# Patient Record
(demographics unavailable — no encounter records)

---

## 2025-01-28 NOTE — BEGINNING OF VISIT
[0] : 2) Feeling down, depressed, or hopeless: Not at all (0) [Pain Scale: ___] : On a scale of 1-10, today the patient's pain is a(n) [unfilled]. [Never] : Never [Diarrhea Character] : Diarrhea: Grade 0 [[id number]] : 0 [Date Discussed (MM/DD/YY): ___] : Discussed: [unfilled] [With Patient/Caregiver] : with Patient/Caregiver [Abdominal Pain] : no abdominal pain [Vomiting] : no vomiting [Constipation] : no constipation

## 2025-01-28 NOTE — HISTORY OF PRESENT ILLNESS
[de-identified] : Hx benign left breast biopsy several years ago.  She underwent bilateral augmentation at age 39 and at age 46, implants were exchanged for rupture of saline implant.  She has not had further complications.  In 2016, she palpated a small density near her right nipple, which  did not change over time.  There was no associated trauma, bruising, bleeding, skin changes, or nipple discharge.  Patient notes last menstrual period was least 1 year ago.  On 17, bilateral mammography and sonography revealed a 5 mm palpable solid slightly irregular nodule 11 o'clock right retroareolar region.  Sonogram-guided biopsy on  revealed invasive dfaotsqavt-km-sdybxf differentiated ductal carcinoma with associated DCIS high-grade, solid type, ER+, [place] + and HER-2/velasquez equivocal, FISH amplified 2.1.  Bilateral MRI 3/1/17, revealed 6 mm oval lobulated enhancing mass in close proximity to the chest wall at 11 o'clock LEFTt suggestive of a fibroadenoma and biopsy proven at 10 o'clock RIGHT subareolar region 6 mm.  On 3/21/17, wide excision and SLN excision revealed invasive carcinoma, moderately differentiated, grade 2 (3,2,1) with focal micropapillary and apocrine features, 6 mm, no definite LVI, DCIS solid and cribriform types with intermediate nuclear grade, margins were negative, 1/2 SLN revealed a 0.15 cm single focus metastatic carcinoma with extranodal extension, ER and NJ both > 50%, Ki-67 >10%, and HER-2/velasquez 0, but FISH was amplified ratio 3.18.  Oncotype recurrence score was 9. Stage 1B - T1b 0.6 cm moderately differentiated infiltrating ductal carcinoma, M1(catalina) 1/2 SLN + for micrometastasis.  Tumor is both ER + and NJ + as well as HER-2 +.  However, Oncotype test had discordant results with score 9; however, possible tumor heterogeneity was discussed in detail.  In light of HER-2 positivity, I have recommended HER-2-based adjuvant chemotherapy. S/P wkly taxol x12 and herceptin for 1 yr. AI 10/17 - 3/24. Hands no longer hurt  "could not  a mug" . Reclast 23#3 . Bilat MMG/US  - for MRI Fall. F/U [facility] for panc cyst   ALLERGIES: NKA.  PMH: Menarche 11, LMP 10y ago.  G5, P3, AB2.   24, 30, and 35. Gyn   No hx breast-feeding.  No exogenous hormones.  GYN , Hx thyroid cancer S/P thyroidectomy and DOBBINS  39.  Colonoscopy  bone density .  S/P GB , hx boniva for 2y prior to ReclastNo other significant medical illnesses.   FAMILY HX:Color panel neg  [place] descent.  Mother pancreatic cancer 69 DOD  80 and had 2 sisters, maternal grandmother had gastric cancer at 68 and DOD at 70, father 79 with prostate cancer 62 and had 1 brother with prostate cancer 60 and DOD 70.  Paternal grandfather had lung cancer in his 80s.  Patient has 1 sister, 2 brothers, 2 daughters, and 1 son.  SOCIAL HX: Domestic , lives with her , drinks 5 glasses of wine per wk.  She exercises 4-5/wk.  Does not smoke. Heterosexual and active  [de-identified] : 1/28/25 no new c/o grand daughters 1 and 3 hair better off of AI - stopped minoxidil using revaree, hetero sex and active GYN 2/24 10/29/24 breast mri 4/16/24 bilat mmg/us colonoscopy 1/24 pilates 4-5 /wk 3-4 wine /wk 10/24/24 [facility] - stable panc cysts

## 2025-01-28 NOTE — ASSESSMENT
[With Patient/Caregiver] : With Patient/Caregiver [Designated Health Care Proxy] : Designated Health Care Proxy [Name: ___] : Name: [unfilled] [Relationship: ___] : Relationship: [unfilled] [FreeTextEntry1] : Stage 1B - T1b 0.6 cm moderately differentiated infiltrating ductal carcinoma, M1(catalina) 1/2 SLN + for micrometastasis.  Tumor is both ER + and SD + as well as HER-2 +.  However, Oncotype test had discordant results with score 9; however, possible tumor heterogeneity was discussed in detail.  In light of HER-2 positivity, I had recommended HER-2-based adjuvant chemotherapy.   S/P wkly taxol x12 and herceptin for 1 yr. AI 10/17 - 3/24. Hands improved off AI. Reclast 9/28/23#3  - [facility]. Needs f/u BMD Reviewed diet and exercise. Bilat MMG/US4/25 - for MRI10/25. F/U [facility] for panc cyst Check cbc, chem D. Routine gyn. F/U 6 months  [AdvancecareDate] : 1/28/25

## 2025-01-28 NOTE — PHYSICAL EXAM
[Fully active, able to carry on all pre-disease performance without restriction] : Status 0 - Fully active, able to carry on all pre-disease performance without restriction [Normal] : affect appropriate [de-identified] : bilat augtation Rt wle/RT, no palp [de-identified] : Lt cervical rib

## 2025-01-28 NOTE — HISTORY OF PRESENT ILLNESS
[de-identified] : Hx benign left breast biopsy several years ago.  She underwent bilateral augmentation at age 39 and at age 46, implants were exchanged for rupture of saline implant.  She has not had further complications.  In 2016, she palpated a small density near her right nipple, which  did not change over time.  There was no associated trauma, bruising, bleeding, skin changes, or nipple discharge.  Patient notes last menstrual period was least 1 year ago.  On 17, bilateral mammography and sonography revealed a 5 mm palpable solid slightly irregular nodule 11 o'clock right retroareolar region.  Sonogram-guided biopsy on  revealed invasive hicefzrjqu-ch-ybdami differentiated ductal carcinoma with associated DCIS high-grade, solid type, ER+, DC + and HER-2/velasquez equivocal, FISH amplified 2.1.  Bilateral MRI 3/1/17, revealed 6 mm oval lobulated enhancing mass in close proximity to the chest wall at 11 o'clock LEFTt suggestive of a fibroadenoma and biopsy proven at 10 o'clock RIGHT subareolar region 6 mm.  On 3/21/17, wide excision and SLN excision revealed invasive carcinoma, moderately differentiated, grade 2 (3,2,1) with focal micropapillary and apocrine features, 6 mm, no definite LVI, DCIS solid and cribriform types with intermediate nuclear grade, margins were negative, 1/2 SLN revealed a 0.15 cm single focus metastatic carcinoma with extranodal extension, ER and DC both > 50%, Ki-67 >10%, and HER-2/velasquez 0, but FISH was amplified ratio 3.18.  Oncotype recurrence score was 9. Stage 1B - T1b 0.6 cm moderately differentiated infiltrating ductal carcinoma, M1(catalina) 1/2 SLN + for micrometastasis.  Tumor is both ER + and DC + as well as HER-2 +.  However, Oncotype test had discordant results with score 9; however, possible tumor heterogeneity was discussed in detail.  In light of HER-2 positivity, I have recommended HER-2-based adjuvant chemotherapy. S/P wkly taxol x12 and herceptin for 1 yr. AI 10/17 - 3/24. Hands no longer hurt  "could not  a mug" . Reclast 23#3 . Bilat MMG/US  - for MRI Fall. F/U [facility] for panc cyst   ALLERGIES: NKA.  PMH: Menarche 11, LMP 10y ago.  G5, P3, AB2.   24, 30, and 35. Gyn   No hx breast-feeding.  No exogenous hormones.  GYN , Hx thyroid cancer S/P thyroidectomy and DOBBINS  39.  Colonoscopy  bone density .  S/P GB , hx boniva for 2y prior to ReclastNo other significant medical illnesses.   FAMILY HX:Color panel neg  [place] descent.  Mother pancreatic cancer 69 DOD  80 and had 2 sisters, maternal grandmother had gastric cancer at 68 and DOD at 70, father 79 with prostate cancer 62 and had 1 brother with prostate cancer 60 and DOD 70.  Paternal grandfather had lung cancer in his 80s.  Patient has 1 sister, 2 brothers, 2 daughters, and 1 son.  SOCIAL HX: Domestic , lives with her , drinks 5 glasses of wine per wk.  She exercises 4-5/wk.  Does not smoke. Heterosexual and active  [de-identified] : 1/28/25 no new c/o grand daughters 1 and 3 hair better off of AI - stopped minoxidil using revaree, hetero sex and active GYN 2/24 10/29/24 breast mri 4/16/24 bilat mmg/us colonoscopy 1/24 pilates 4-5 /wk 3-4 wine /wk 10/24/24 [facility] - stable panc cysts

## 2025-01-28 NOTE — PHYSICAL EXAM
[Fully active, able to carry on all pre-disease performance without restriction] : Status 0 - Fully active, able to carry on all pre-disease performance without restriction [Normal] : affect appropriate [de-identified] : bilat augtation Rt wle/RT, no palp [de-identified] : Lt cervical rib

## 2025-01-28 NOTE — REVIEW OF SYSTEMS
[Diarrhea: Grade 0] : Diarrhea: Grade 0 [Negative] : Allergic/Immunologic [FreeTextEntry8] : dryness reveree

## 2025-01-28 NOTE — ASSESSMENT
[With Patient/Caregiver] : With Patient/Caregiver [Designated Health Care Proxy] : Designated Health Care Proxy [Name: ___] : Name: [unfilled] [Relationship: ___] : Relationship: [unfilled] [FreeTextEntry1] : Stage 1B - T1b 0.6 cm moderately differentiated infiltrating ductal carcinoma, M1(catalina) 1/2 SLN + for micrometastasis.  Tumor is both ER + and DC + as well as HER-2 +.  However, Oncotype test had discordant results with score 9; however, possible tumor heterogeneity was discussed in detail.  In light of HER-2 positivity, I had recommended HER-2-based adjuvant chemotherapy.   S/P wkly taxol x12 and herceptin for 1 yr. AI 10/17 - 3/24. Hands improved off AI. Reclast 9/28/23#3  - [facility]. Needs f/u BMD Reviewed diet and exercise. Bilat MMG/US4/25 - for MRI10/25. F/U [facility] for panc cyst Check cbc, chem D. Routine gyn. F/U 6 months  [AdvancecareDate] : 1/28/25

## 2025-01-28 NOTE — HISTORY OF PRESENT ILLNESS
[de-identified] : Hx benign left breast biopsy several years ago.  She underwent bilateral augmentation at age 39 and at age 46, implants were exchanged for rupture of saline implant.  She has not had further complications.  In 2016, she palpated a small density near her right nipple, which  did not change over time.  There was no associated trauma, bruising, bleeding, skin changes, or nipple discharge.  Patient notes last menstrual period was least 1 year ago.  On 17, bilateral mammography and sonography revealed a 5 mm palpable solid slightly irregular nodule 11 o'clock right retroareolar region.  Sonogram-guided biopsy on  revealed invasive vhwcbnutlz-nq-ovxrdd differentiated ductal carcinoma with associated DCIS high-grade, solid type, ER+, NV + and HER-2/velasquez equivocal, FISH amplified 2.1.  Bilateral MRI 3/1/17, revealed 6 mm oval lobulated enhancing mass in close proximity to the chest wall at 11 o'clock LEFTt suggestive of a fibroadenoma and biopsy proven at 10 o'clock RIGHT subareolar region 6 mm.  On 3/21/17, wide excision and SLN excision revealed invasive carcinoma, moderately differentiated, grade 2 (3,2,1) with focal micropapillary and apocrine features, 6 mm, no definite LVI, DCIS solid and cribriform types with intermediate nuclear grade, margins were negative, 1/2 SLN revealed a 0.15 cm single focus metastatic carcinoma with extranodal extension, ER and NV both > 50%, Ki-67 >10%, and HER-2/velasquez 0, but FISH was amplified ratio 3.18.  Oncotype recurrence score was 9. Stage 1B - T1b 0.6 cm moderately differentiated infiltrating ductal carcinoma, M1(catalina) 1/2 SLN + for micrometastasis.  Tumor is both ER + and NV + as well as HER-2 +.  However, Oncotype test had discordant results with score 9; however, possible tumor heterogeneity was discussed in detail.  In light of HER-2 positivity, I have recommended HER-2-based adjuvant chemotherapy. S/P wkly taxol x12 and herceptin for 1 yr. AI 10/17 - 3/24. Hands no longer hurt  "could not  a mug" . Reclast 23#3 . Bilat MMG/US  - for MRI Fall. F/U [facility] for panc cyst   ALLERGIES: NKA.  PMH: Menarche 11, LMP 10y ago.  G5, P3, AB2.   24, 30, and 35. Gyn   No hx breast-feeding.  No exogenous hormones.  GYN , Hx thyroid cancer S/P thyroidectomy and DOBBINS  39.  Colonoscopy  bone density .  S/P GB , hx boniva for 2y prior to ReclastNo other significant medical illnesses.   FAMILY HX:Color panel neg  [place] descent.  Mother pancreatic cancer 69 DOD  80 and had 2 sisters, maternal grandmother had gastric cancer at 68 and DOD at 70, father 79 with prostate cancer 62 and had 1 brother with prostate cancer 60 and DOD 70.  Paternal grandfather had lung cancer in his 80s.  Patient has 1 sister, 2 brothers, 2 daughters, and 1 son.  SOCIAL HX: Domestic , lives with her , drinks 5 glasses of wine per wk.  She exercises 4-5/wk.  Does not smoke. Heterosexual and active  [de-identified] : 1/28/25 no new c/o grand daughters 1 and 3 hair better off of AI - stopped minoxidil using revaree, hetero sex and active GYN 2/24 10/29/24 breast mri 4/16/24 bilat mmg/us colonoscopy 1/24 pilates 4-5 /wk 3-4 wine /wk 10/24/24 [facility] - stable panc cysts

## 2025-01-28 NOTE — BEGINNING OF VISIT
[0] : 2) Feeling down, depressed, or hopeless: Not at all (0) [Pain Scale: ___] : On a scale of 1-10, today the patient's pain is a(n) [unfilled]. [Never] : Never [Diarrhea Character] : Diarrhea: Grade 0 [[id number]] : 0 [Date Discussed (MM/DD/YY): ___] : Discussed: [unfilled] [With Patient/Caregiver] : with Patient/Caregiver [Abdominal Pain] : no abdominal pain [Vomiting] : no vomiting [Constipation] : no constipation

## 2025-01-28 NOTE — PHYSICAL EXAM
[Fully active, able to carry on all pre-disease performance without restriction] : Status 0 - Fully active, able to carry on all pre-disease performance without restriction [Normal] : affect appropriate [de-identified] : Lt cervical rib [de-identified] : bilat augtation Rt wle/RT, no palp

## 2025-01-28 NOTE — ASSESSMENT
[With Patient/Caregiver] : With Patient/Caregiver [Designated Health Care Proxy] : Designated Health Care Proxy [Name: ___] : Name: [unfilled] [Relationship: ___] : Relationship: [unfilled] [FreeTextEntry1] : Stage 1B - T1b 0.6 cm moderately differentiated infiltrating ductal carcinoma, M1(catalina) 1/2 SLN + for micrometastasis.  Tumor is both ER + and VA + as well as HER-2 +.  However, Oncotype test had discordant results with score 9; however, possible tumor heterogeneity was discussed in detail.  In light of HER-2 positivity, I had recommended HER-2-based adjuvant chemotherapy.   S/P wkly taxol x12 and herceptin for 1 yr. AI 10/17 - 3/24. Hands improved off AI. Reclast 9/28/23#3  - [facility] at [facility]. Needs f/u BMD Reviewed diet and exercise. Bilat MMG/US4/25 - for MRI10/25. F/U [facility] for panc cyst Check cbc, chem D. Routine gyn. F/U 6 months  [AdvancecareDate] : 1/28/25

## 2025-01-28 NOTE — ASSESSMENT
[With Patient/Caregiver] : With Patient/Caregiver [Designated Health Care Proxy] : Designated Health Care Proxy [Name: ___] : Name: [unfilled] [Relationship: ___] : Relationship: [unfilled] [FreeTextEntry1] : Stage 1B - T1b 0.6 cm moderately differentiated infiltrating ductal carcinoma, M1(catalina) 1/2 SLN + for micrometastasis.  Tumor is both ER + and OR + as well as HER-2 +.  However, Oncotype test had discordant results with score 9; however, possible tumor heterogeneity was discussed in detail.  In light of HER-2 positivity, I had recommended HER-2-based adjuvant chemotherapy.   S/P wkly taxol x12 and herceptin for 1 yr. AI 10/17 - 3/24. Hands improved off AI. Reclast 9/28/23#3  - [facility] at [facility] Needs f/u BMD Reviewed diet and exercise. Bilat MMG/US4/25 - for MRI10/25. F/U [facility] for panc cyst Check cbc, chem D. Routine gyn. F/U 6 months  [AdvancecareDate] : 1/28/25

## 2025-01-28 NOTE — BEGINNING OF VISIT
[0] : 2) Feeling down, depressed, or hopeless: Not at all (0) [Pain Scale: ___] : On a scale of 1-10, today the patient's pain is a(n) [unfilled]. [Never] : Never [Diarrhea Character] : Diarrhea: Grade 0 [[id number]] : 0 [Date Discussed (MM/DD/YY): ___] : Discussed: [unfilled] [With Patient/Caregiver] : with Patient/Caregiver [Abdominal Pain] : no abdominal pain [Vomiting] : no vomiting [Constipation] : no constipation

## 2025-01-28 NOTE — HISTORY OF PRESENT ILLNESS
[de-identified] : Hx benign left breast biopsy several years ago.  She underwent bilateral augmentation at age 39 and at age 46, implants were exchanged for rupture of saline implant.  She has not had further complications.  In 2016, she palpated a small density near her right nipple, which  did not change over time.  There was no associated trauma, bruising, bleeding, skin changes, or nipple discharge.  Patient notes last menstrual period was least 1 year ago.  On 17, bilateral mammography and sonography revealed a 5 mm palpable solid slightly irregular nodule 11 o'clock right retroareolar region.  Sonogram-guided biopsy on  revealed invasive mepvowsaej-mz-kyhoeb differentiated ductal carcinoma with associated DCIS high-grade, solid type, ER+, [place] + and HER-2/velasquez equivocal, FISH amplified 2.1.  Bilateral MRI 3/1/17, revealed 6 mm oval lobulated enhancing mass in close proximity to the chest wall at 11 o'clock LEFTt suggestive of a fibroadenoma and biopsy proven at 10 o'clock RIGHT subareolar region 6 mm.  On 3/21/17, wide excision and SLN excision revealed invasive carcinoma, moderately differentiated, grade 2 (3,2,1) with focal micropapillary and apocrine features, 6 mm, no definite LVI, DCIS solid and cribriform types with intermediate nuclear grade, margins were negative, 1/2 SLN revealed a 0.15 cm single focus metastatic carcinoma with extranodal extension, ER and W[place] both > 50%, Ki-67 >10%, and HER-2/velasquez 0, but FISH was amplified ratio 3.18.  Oncotype recurrence score was 9. Stage 1B - T1b 0.6 cm moderately differentiated infiltrating ductal carcinoma, M1(catalina) 1/2 SLN + for micrometastasis.  Tumor is both ER + and W[place] + as well as HER-2 +.  However, Oncotype test had discordant results with score 9; however, possible tumor heterogeneity was discussed in detail.  In light of HER-2 positivity, I have recommended HER-2-based adjuvant chemotherapy. S/P wkly taxol x12 and herceptin for 1 yr. AI 10/17 - 3/24. Hands no longer hurt  "could not  a mug" . Reclast 23#3 . Bilat MMG/US  - for MRI Fall. F/U [facility] for panc cyst   ALLERGIES: NKA.  PMH: Menarche 11, LMP 10y ago.  G5, P3, AB2.   24, 30, and 35. Gyn   No hx breast-feeding.  No exogenous hormones.  GYN , Hx thyroid cancer S/P thyroidectomy and DOBBINS  39.  Colonoscopy  bone density .  S/P GB , hx boniva for 2y prior to ReclastNo other significant medical illnesses.   FAMILY HX:Color panel neg  [place] descent.  Mother pancreatic cancer 69 DOD  80 and had 2 sisters, maternal grandmother had gastric cancer at 68 and DOD at 70, father 79 with prostate cancer 62 and had 1 brother with prostate cancer 60 and DOD 70.  Paternal grandfather had lung cancer in his 80s.  Patient has 1 sister, 2 brothers, 2 daughters, and 1 son.  SOCIAL HX: Domestic , lives with her , drinks 5 glasses of wine per wk.  She exercises 4-5/wk.  Does not smoke. Heterosexual and active  [de-identified] : 1/28/25 no new c/o grand daughters 1 and 3 hair better off of AI - stopped minoxidil using revaree, hetero sex and active GYN 2/24 10/29/24 breast mri 4/16/24 bilat mmg/us colonoscopy 1/24 pilates 4-5 /wk 3-4 wine /wk 10/24/24 [facility] - stable panc cysts

## 2025-01-28 NOTE — BEGINNING OF VISIT
[0] : 2) Feeling down, depressed, or hopeless: Not at all (0) [Pain Scale: ___] : On a scale of 1-10, today the patient's pain is a(n) [unfilled]. [Never] : Never [Diarrhea Character] : Diarrhea: Grade 0 [[id number]] : 0 [Date Discussed (MM/DD/YY): ___] : Discussed: [unfilled] [With Patient/Caregiver] : with Patient/Caregiver [Abdominal Pain] : no abdominal pain [Vomiting] : no vomiting [Constipation] : no constipation